# Patient Record
Sex: FEMALE | URBAN - METROPOLITAN AREA
[De-identification: names, ages, dates, MRNs, and addresses within clinical notes are randomized per-mention and may not be internally consistent; named-entity substitution may affect disease eponyms.]

---

## 2023-03-07 ENCOUNTER — PROCEDURE VISIT (OUTPATIENT)
Dept: SPORTS MEDICINE | Age: 18
End: 2023-03-07

## 2023-03-07 DIAGNOSIS — G89.29 CHRONIC PAIN OF RIGHT KNEE: Primary | ICD-10-CM

## 2023-03-07 DIAGNOSIS — M25.561 CHRONIC PAIN OF RIGHT KNEE: Primary | ICD-10-CM

## 2023-03-07 NOTE — PROGRESS NOTES
Wrap  [] Elevate  [] Tape  [] First Aid/Wound [] Moist Heat  [] Crutches  [] Brace  [] Splint  [] Sling  [] Immobilizer   [] Whirlpool  [] Massage  [] Pneumatic  [x] Rehab/Exercise  [] Other:   Guardian Contacted: No  Comments / Instructions: We discussed that this is likely a re-injury of her previous meniscus tear. We discussed referral to a physician, vs. Starting with conservative treatment. China Mejia would like to start conservatively with therapeutic exercises, as that was effective for her last time. 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 China Mejia and I discussed possible risks of continuing to cheer with a possible mensicus tear. She understands, and has agreed that if her knee ever robb, gives out, or feels unstable she will stop cheering and follow up with a physician earlier. She will also consider seeing a physician if her pain increases. Follow up in [address] [place] [address] to begin therapeutic exercises on Friday. Follow-Up Care / Instructions:   and Orthopaedic  Discharged: No  Electronically Signed By: Ayana Ash, ATR, LAT, ATC

## 2023-03-10 ENCOUNTER — PROCEDURE VISIT (OUTPATIENT)
Dept: SPORTS MEDICINE | Age: 18
End: 2023-03-10

## 2023-03-10 DIAGNOSIS — S83.203D OTHER TEAR OF MENISCUS OF RIGHT KNEE, UNSPECIFIED MENISCUS, UNSPECIFIED WHETHER OLD OR CURRENT TEAR, SUBSEQUENT ENCOUNTER: Primary | ICD-10-CM

## 2023-03-10 NOTE — PROGRESS NOTES
Athletic Training Daily Treatment Note  Foot and Ankle    Date:  3/10/2023    Patient Name:  Candida Hampton YOB: 2005  MRN: <[id number]>  Medical/Treatment Diagnosis Information:    Diagnosis Orders   1. Other tear of meniscus of right knee, unspecified meniscus, unspecified whether old or current tear, subsequent encounter          Israel Hunter comes in today to begin therapeutic exercise for conservative treatment of meniscal tear. She reported that her knee is feeling pretty sore today after having two practices this week.        PLAN:     Today Israle Hunter Did the following exercises:    FR LE  Knee extenstions 5# 3x10  Hamstring Curls 7.5# 3x10  Clamshells 3x8  Reverse clamshells 3x8  Glute bridges 3x10  Mini squats 3x10        Electronically signed by:

## 2023-03-14 ENCOUNTER — PROCEDURE VISIT (OUTPATIENT)
Dept: SPORTS MEDICINE | Age: 18
End: 2023-03-14

## 2023-03-14 DIAGNOSIS — M25.561 CHRONIC PAIN OF RIGHT KNEE: Primary | ICD-10-CM

## 2023-03-14 DIAGNOSIS — G89.29 CHRONIC PAIN OF RIGHT KNEE: Primary | ICD-10-CM

## 2023-03-14 NOTE — PROGRESS NOTES
Athletic Training Daily Treatment Note  Foot and Ankle    Date:  3/14/2023    Patient Name:  Waqar Huitron    :  2005  MRN: <[id number]>  Medical/Treatment Diagnosis Information:    Diagnosis Orders   1. Chronic pain of right knee          Pato Bowen comes in today to continue therapeutic exercises for her knee conservative treatment of meniscal tear. She reported that her knee is feeling pretty sore today after having two practices this week. She has been completing her HEP daily. Pato Bowen is going home for Spring Break, and will not be available to come in for exercises until the following week. PLAN:     Today Pato Bowen Did the following exercises:    FR LE  Knee extenstions 5# 3x10  Hamstring Curls 7.5# 3x10  Clamshells 3x8  Reverse clamshells 3x8  Glute bridges 3x10  Heel Taps 3x10  4 Way Hip 2x10 Chanel Nation completed all exercises without issue, but was quite sore by the end. Continue daily HEP. Follow up in [address] [place] [address] when she returns from Spring Break. We will discuss referral to a physician if her pain does not significantly improve over break.

## 2023-03-28 ENCOUNTER — PROCEDURE VISIT (OUTPATIENT)
Dept: SPORTS MEDICINE | Age: 18
End: 2023-03-28

## 2023-03-28 DIAGNOSIS — M25.561 CHRONIC PAIN OF RIGHT KNEE: Primary | ICD-10-CM

## 2023-03-28 DIAGNOSIS — G89.29 CHRONIC PAIN OF RIGHT KNEE: Primary | ICD-10-CM

## 2023-03-28 NOTE — PROGRESS NOTES
Athletic Training Daily Treatment Note  Foot and Ankle    Date:  3/28/2023    Patient Name:  John Wolff    :  2005  MRN: <[id number]>  Medical/Treatment Diagnosis Information:    Diagnosis Orders   1. Chronic pain of right knee          Kolton Bullock comes in today to continue therapeutic exercises for her right knee pain. She notes she has not had practice in over a week, so her constant pain has improved. However, she feels her pain with activity is unchanged. PLAN:     Today Kolton Bullock completed  the following exercises:    FR LE  Knee extenstions 5# 3x12  Hamstring Curls 7.5# 3x12  SLR ER 2# 2x12  Clamshells 3x12  Reverse clamshells 3x12  4 Way Hip 2x12  Heel Taps 2x12    Jeronimo completed all exercises today without issue. Follow up in [address] [place] [address] to continue therapeutic exercises. Continue HEP daily. I recommend that Kolton Bullock follow up with a physician. 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 Kolton Bullock will leave in 8 days for nationals.

## 2023-04-04 ENCOUNTER — PROCEDURE VISIT (OUTPATIENT)
Dept: SPORTS MEDICINE | Age: 18
End: 2023-04-04

## 2023-04-04 DIAGNOSIS — G89.29 CHRONIC PAIN OF RIGHT KNEE: Primary | ICD-10-CM

## 2023-04-04 DIAGNOSIS — S83.203D OTHER TEAR OF MENISCUS OF RIGHT KNEE, UNSPECIFIED MENISCUS, UNSPECIFIED WHETHER OLD OR CURRENT TEAR, SUBSEQUENT ENCOUNTER: ICD-10-CM

## 2023-04-04 DIAGNOSIS — M25.561 CHRONIC PAIN OF RIGHT KNEE: Primary | ICD-10-CM

## 2023-04-04 NOTE — PROGRESS NOTES
Athletic Training Daily Treatment Note  Foot and Ankle    Date:  2023    Patient Name:  Ed Sanchez    :  2005  MRN: <[id number]>  Medical/Treatment Diagnosis Information:    Diagnosis Orders   1. Chronic pain of right knee        2. Other tear of meniscus of right knee, unspecified meniscus, unspecified whether old or current tear, subsequent encounter            Michael Rivas comes in today to continue therapeutic exercises for her right knee pain. While she notes feeling stronger, Michael Dimasdean has not noticed a change in her pain. Pallavi Alvares tomorrow for [employer]. Per our conversation last week, she and her parents have decided she would like to see a physician next week when she returns. PLAN:     Today Michael Rivas completed  the following exercises:    FR LE  Knee extenstions 5# 3x12  Hamstring Curls 7.5# 3x12  SLR ER 2# 2x12  Clamshells 3x12  Reverse clamshells 3x12  4 Way Hip 2x12  Heel Taps 3x10    Jeronimo completed all exercises today without issue. Continue HEP daily. I have assisted Michael Rivas in scheduling an appointment to see Dr. Jolanta Gil on Monday.

## 2023-04-17 ENCOUNTER — OFFICE VISIT (OUTPATIENT)
Dept: ORTHOPEDIC SURGERY | Age: 18
End: 2023-04-17
Payer: COMMERCIAL

## 2023-04-17 VITALS — WEIGHT: 132 LBS | HEIGHT: 65 IN | BODY MASS INDEX: 21.99 KG/M2

## 2023-04-17 DIAGNOSIS — M22.2X1 PATELLOFEMORAL SYNDROME OF RIGHT KNEE: ICD-10-CM

## 2023-04-17 DIAGNOSIS — M25.561 RIGHT KNEE PAIN, UNSPECIFIED CHRONICITY: Primary | ICD-10-CM

## 2023-04-17 PROCEDURE — 99213 OFFICE O/P EST LOW 20 MIN: CPT | Performed by: EMERGENCY MEDICINE

## 2023-04-17 NOTE — PROGRESS NOTES
FOLLOW UP VISIT    Chief Complaint   Patient presents with    Knee Pain     RIGHT KNEE-MRI RESULTS       HPI:    Symone Choudhary is a 21 y.o. female who presents for follow-up MRI review - right knee. At the last visit on 4/10/2023, patient was seen and examined for right knee pain. She was concern for recurrent meniscus injury. She did have tenderness to palpation over the lateral joint line and a positive lateral Summer's. Therefore, MRI right knee was obtained. Since the last visit, Symone Choudhary has noted persistent symptoms. She has been in physical therapy. She is most concerned about a medial meniscus injury. She had an MRI and presents for MRI review. Medical History  Patient's medications, allergies, past medical, surgical, social, and family histories were reviewed and updated as appropriate. Physical Examination:  General: Well appearing female, in no acute distress  Respiratory: Normal respiratory effort  Cardiovascular: No visual or palpable edema  Skin: no identified rashes, no induration, erythema or cyanosis  Neurologic: Light touch sensation is intact, no allodynia or hyperalgesia  Gait: Normal gait and station  Extremities: No evidence of clubbing, cyanosis  MSK: Right knee: No swelling, normal range of motion      Radiology:  MRI images of the right knee dated 4/11/2023 were reviewed independently and discussed with the patient. The films revealed:   1. Patellar maltracking, with inflammation of the lateral infrapatellar fat pad, patella alto, and lateral tilt of the patella. 2.  Subtle subcortical osteoedema of the mid body lateral patellar facet, without overlying chondral defect. 3.  Increased vascularity within the medial meniscus posterior horn, without evidence of communicating tear; may represent posttraumatic intrameniscal contusion versus normal variant adolescent meniscal vascularity. 4.  No lateral meniscus tear.   No cruciate or collateral ligament